# Patient Record
Sex: MALE | Race: WHITE | URBAN - METROPOLITAN AREA
[De-identification: names, ages, dates, MRNs, and addresses within clinical notes are randomized per-mention and may not be internally consistent; named-entity substitution may affect disease eponyms.]

---

## 2019-04-10 ENCOUNTER — TELEPHONE (OUTPATIENT)
Dept: CARDIOLOGY CLINIC | Age: 69
End: 2019-04-10

## 2019-04-10 NOTE — TELEPHONE ENCOUNTER
Pt having MRI and states he had a stent put in by [facility] many years back and asking what type of stent was used. Please call to advise.